# Patient Record
Sex: MALE | URBAN - METROPOLITAN AREA
[De-identification: names, ages, dates, MRNs, and addresses within clinical notes are randomized per-mention and may not be internally consistent; named-entity substitution may affect disease eponyms.]

---
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
## 2023-11-16 ENCOUNTER — OFFICE VISIT (OUTPATIENT)
Dept: FAMILY MEDICINE | Facility: CLINIC | Age: 28
End: 2023-11-16
Payer: COMMERCIAL

## 2023-11-16 VITALS
HEART RATE: 102 BPM | DIASTOLIC BLOOD PRESSURE: 87 MMHG | BODY MASS INDEX: 31.66 KG/M2 | HEIGHT: 69 IN | SYSTOLIC BLOOD PRESSURE: 127 MMHG | WEIGHT: 213.75 LBS | OXYGEN SATURATION: 98 % | TEMPERATURE: 98.3 F

## 2023-11-16 DIAGNOSIS — Z13.228 SCREENING FOR METABOLIC DISORDER: Primary | ICD-10-CM

## 2023-11-16 DIAGNOSIS — Z13.220 LIPID SCREENING: ICD-10-CM

## 2023-11-16 NOTE — NURSING NOTE
"Montrell  28 year old    Chief Complaint   Patient presents with    \[facility]\"" Care     First time getting health care in the [place], moved in Sept 2022    Gastrointestinal Problem     Blood in stool a week ago but went away after 4-5 days, has had this occur 6 months ago as well    Physical            Blood pressure 127/87, pulse 102, temperature 98.3  F (36.8  C), temperature source Skin, height 1.761 m (5' 9.33\"), weight 97 kg (213 lb 12 oz), SpO2 98%. Body mass index is 31.26 kg/m .  There is no problem list on file for this patient.             Wt Readings from Last 2 Encounters:   11/16/23 97 kg (213 lb 12 oz)       BP Readings from Last 3 Encounters:   11/16/23 127/87                No current outpatient medications on file.     No current facility-administered medications for this visit.              Social History     Tobacco Use    Smoking status: Never    Smokeless tobacco: Never   Substance Use Topics    Alcohol use: Never    Drug use: Never            There are no preventive care reminders to display for this patient.         No results found for: \"PAP\"           November 16, 2023 2:39 PM    "

## 2023-11-17 ENCOUNTER — TELEPHONE (OUTPATIENT)
Dept: FAMILY MEDICINE | Facility: CLINIC | Age: 28
End: 2023-11-17

## 2023-11-17 NOTE — TELEPHONE ENCOUNTER
Who is calling? Patient  Reason for Call: Requesting referral for the weight management program.

## 2023-11-22 ENCOUNTER — LAB (OUTPATIENT)
Dept: LAB | Facility: CLINIC | Age: 28
End: 2023-11-22
Payer: COMMERCIAL

## 2023-11-22 ENCOUNTER — MYC MEDICAL ADVICE (OUTPATIENT)
Dept: FAMILY MEDICINE | Facility: CLINIC | Age: 28
End: 2023-11-22

## 2023-11-22 DIAGNOSIS — R73.9 BLOOD GLUCOSE ELEVATED: Primary | ICD-10-CM

## 2023-11-22 DIAGNOSIS — Z13.228 SCREENING FOR METABOLIC DISORDER: ICD-10-CM

## 2023-11-22 DIAGNOSIS — Z13.220 LIPID SCREENING: ICD-10-CM

## 2023-11-22 LAB
ANION GAP SERPL CALCULATED.3IONS-SCNC: 11 MMOL/L (ref 7–15)
BUN SERPL-MCNC: 13 MG/DL (ref 6–20)
CALCIUM SERPL-MCNC: 9.5 MG/DL (ref 8.6–10)
CHLORIDE SERPL-SCNC: 100 MMOL/L (ref 98–107)
CHOLEST SERPL-MCNC: 205 MG/DL
CREAT SERPL-MCNC: 0.76 MG/DL (ref 0.67–1.17)
DEPRECATED HCO3 PLAS-SCNC: 23 MMOL/L (ref 22–29)
EGFRCR SERPLBLD CKD-EPI 2021: >90 ML/MIN/1.73M2
GLUCOSE SERPL-MCNC: 125 MG/DL (ref 70–99)
HDLC SERPL-MCNC: 35 MG/DL
LDLC SERPL CALC-MCNC: 106 MG/DL
NONHDLC SERPL-MCNC: 170 MG/DL
POTASSIUM SERPL-SCNC: 4.1 MMOL/L (ref 3.4–5.3)
SODIUM SERPL-SCNC: 134 MMOL/L (ref 135–145)
TRIGL SERPL-MCNC: 319 MG/DL

## 2023-11-22 PROCEDURE — 80061 LIPID PANEL: CPT

## 2023-11-22 PROCEDURE — 80048 BASIC METABOLIC PNL TOTAL CA: CPT

## 2023-11-24 ENCOUNTER — LAB (OUTPATIENT)
Dept: LAB | Facility: CLINIC | Age: 28
End: 2023-11-24
Payer: COMMERCIAL

## 2023-11-24 DIAGNOSIS — R73.9 BLOOD GLUCOSE ELEVATED: ICD-10-CM

## 2023-11-24 LAB — HBA1C MFR BLD: 6.9 %

## 2023-11-24 PROCEDURE — 83036 HEMOGLOBIN GLYCOSYLATED A1C: CPT

## 2023-11-24 NOTE — TELEPHONE ENCOUNTER
Placing order for HgbA1C lab test based on fasting glucose level 125 mg/dL on 11/22/2023.  Notified pt to schedule lab-only visit, and based on those results, Dr. Varela will determine next steps.    ARIANE QuintanillaN, RN  11/24/23, 9:17 AM

## 2023-11-27 ENCOUNTER — OFFICE VISIT (OUTPATIENT)
Dept: FAMILY MEDICINE | Facility: CLINIC | Age: 28
End: 2023-11-27
Payer: COMMERCIAL

## 2023-11-27 VITALS
HEART RATE: 96 BPM | TEMPERATURE: 97.6 F | SYSTOLIC BLOOD PRESSURE: 125 MMHG | OXYGEN SATURATION: 97 % | DIASTOLIC BLOOD PRESSURE: 79 MMHG

## 2023-11-27 DIAGNOSIS — E11.9 TYPE 2 DIABETES MELLITUS WITHOUT COMPLICATION, WITHOUT LONG-TERM CURRENT USE OF INSULIN (H): Primary | ICD-10-CM

## 2023-11-27 RX ORDER — METFORMIN HCL 500 MG
500 TABLET, EXTENDED RELEASE 24 HR ORAL
Qty: 90 TABLET | Refills: 1 | Status: SHIPPED | OUTPATIENT
Start: 2023-11-27 | End: 2024-05-20

## 2023-11-27 NOTE — NURSING NOTE
"Montrell  28 year old    Chief Complaint   Patient presents with    Results     Discuss lab results and treatments - A1C and cholesterol            Blood pressure 125/79, pulse 96, temperature 97.6  F (36.4  C), temperature source Skin, SpO2 97%. There is no height or weight on file to calculate BMI.  There is no problem list on file for this patient.             Wt Readings from Last 2 Encounters:   11/16/23 97 kg (213 lb 12 oz)       BP Readings from Last 3 Encounters:   11/27/23 125/79   11/16/23 127/87                No current outpatient medications on file.     No current facility-administered medications for this visit.              Social History     Tobacco Use    Smoking status: Never    Smokeless tobacco: Never   Substance Use Topics    Alcohol use: Never    Drug use: Never              Health Maintenance Due   Topic Date Due    ADVANCE CARE PLANNING  Never done    HEPATITIS B IMMUNIZATION (1 of 3 - 3-dose series) Never done    COVID-19 Vaccine (1) Never done    HIV SCREENING  Never done    HEPATITIS C SCREENING  Never done    DTAP/TDAP/TD IMMUNIZATION (1 - Tdap) Never done    INFLUENZA VACCINE (1) Never done            No results found for: \"PAP\"           November 27, 2023 2:08 PM    "

## 2023-11-27 NOTE — PROGRESS NOTES
Assessment & Plan   Problem List Items Addressed This Visit    None  Visit Diagnoses       Type 2 diabetes mellitus without complication, without long-term current use of insulin (H)    -  Primary    Relevant Medications    metFORMIN (GLUCOPHAGE XR) 500 MG 24 hr tablet           Starting metformin today, 500mg nightly. Reviewed possible books to help Montrell navigate diabetes. 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 Ideally, Montrell would like to try and manage this without medication if possible but he understands medication may be necessary. We will continue to monitor going forward.     26 minutes spent on the date of the encounter doing chart review, history and exam, documentation and further activities as noted.      MD DIANE Paredes [facility]    Subjective   Montrell is a 28 year old, presenting for the following health issues:  Results (Discuss lab results and treatments - A1C and cholesterol)      HPI   Lab Results  - A1c from 11/24 was 6.9  - Montrell also concerned about his lipid panel: mostly elevated triglycerides  - denies increased urinary frequency  - contacted his insurance provider about possibly meeting with Felecia but it doesn't appear she is in network      Review of Systems   Constitutional, HEENT, cardiovascular, pulmonary, gi and gu systems are negative, except as otherwise noted.      Objective    /79 (BP Location: Left arm, Patient Position: Sitting, Cuff Size: Adult Large)   Pulse 96   Temp 97.6  F (36.4  C) (Skin)   SpO2 97%   There is no height or weight on file to calculate BMI.  Physical Exam   GENERAL: healthy, alert and no distress  NECK: no adenopathy, no asymmetry, masses, or scars and thyroid normal to palpation  RESP: lungs clear to auscultation - no rales, rhonchi or wheezes  CV: regular rate and rhythm, normal  S1 S2, no S3 or S4, no murmur, click or rub, no peripheral edema and peripheral pulses strong  ABDOMEN: soft, nontender, no hepatosplenomegaly, no masses and bowel sounds normal  MS: no gross musculoskeletal defects noted, no edema

## 2024-02-20 ENCOUNTER — MYC REFILL (OUTPATIENT)
Dept: FAMILY MEDICINE | Facility: CLINIC | Age: 29
End: 2024-02-20

## 2024-02-20 DIAGNOSIS — E11.9 TYPE 2 DIABETES MELLITUS WITHOUT COMPLICATION, WITHOUT LONG-TERM CURRENT USE OF INSULIN (H): ICD-10-CM

## 2024-02-20 RX ORDER — METFORMIN HCL 500 MG
500 TABLET, EXTENDED RELEASE 24 HR ORAL
Qty: 90 TABLET | Refills: 1 | Status: CANCELLED | OUTPATIENT
Start: 2024-02-20

## 2024-02-20 NOTE — TELEPHONE ENCOUNTER
Pt should have refill of metformin remaining at pharmacy. Advised to call pharmacy to request they fill it. Placed order for 3 month A1c recheck after starting metformin in November. Pt instructed to schedule lab-only appointment.    German MILLAN, RN  02/20/24 11:45 AM

## 2024-02-26 ENCOUNTER — OFFICE VISIT (OUTPATIENT)
Dept: FAMILY MEDICINE | Facility: CLINIC | Age: 29
End: 2024-02-26
Payer: COMMERCIAL

## 2024-02-26 VITALS
WEIGHT: 200 LBS | SYSTOLIC BLOOD PRESSURE: 113 MMHG | TEMPERATURE: 97.6 F | OXYGEN SATURATION: 97 % | RESPIRATION RATE: 15 BRPM | DIASTOLIC BLOOD PRESSURE: 79 MMHG | HEIGHT: 69 IN | HEART RATE: 85 BPM | BODY MASS INDEX: 29.62 KG/M2

## 2024-02-26 DIAGNOSIS — E11.9 TYPE 2 DIABETES MELLITUS WITHOUT COMPLICATION, WITHOUT LONG-TERM CURRENT USE OF INSULIN (H): Primary | ICD-10-CM

## 2024-02-26 LAB — HBA1C MFR BLD: 5.9 % (ref 0–5.6)

## 2024-02-26 NOTE — PROGRESS NOTES
"  Assessment & Plan   Problem List Items Addressed This Visit    None  Visit Diagnoses       Type 2 diabetes mellitus without complication, without long-term current use of insulin (H)    -  Primary    Relevant Orders    Hemoglobin A1c (Completed)    Lipid Profile    Hemoglobin A1c           Great improvement with lifestyle changes and minimal medication. No change in treatment strategy for now. Will repeat labs including lipids in 3 months.     Carloz Varela MD  12:52 PM, February 26, 2024        Dee Stock is a 28 year old, presenting for the following health issues:  Recheck Medication (Med check for metformin and needs a hemoglobin A1c check. )    HPI   # Type 2 Diabetes Mellitus  Lab Results   Component Value Date    A1C 5.9 02/26/2024    A1C 6.9 11/24/2023     Lab Results   Component Value Date    CR 0.76 11/22/2023   No results found for: \"UMALSP\", \"UMALCR\", \"UCRR\"  - Current Regimen: metformin XR, 500mg nightly  - Missed doses: no  - Self monitoring blood gluose?: no  - Hypoglycemic episodes?: no    Wt Readings from Last 5 Encounters:   02/26/24 90.7 kg (200 lb)   11/16/23 97 kg (213 lb 12 oz)   - Exercise: consistent  - Diet: improved  - Last eye exam: annual  - Last dental exam: semi-annual    Recent Labs   Lab Test 11/22/23  0911   CHOL 205*   HDL 35*   *   TRIG 319*   - History of ASCVD?: no  - On ASA and statin?: no    Typical Goal: A1c <7.0%, fasting glucose , postprandial glucose <180  Consider checking CBC or B12 level  If h/o ASCVD, rx SGLT2i (empagliflozin, canagliflozin) or GLP1 RA (liraglutide, semaglutide, exenatide ER)  If h/o CKD, rx SGLT2i (empagliflozin, canagliflozin).         Review of Systems  Constitutional, HEENT, cardiovascular, pulmonary, gi and gu systems are negative, except as otherwise noted.      Objective    /79 (BP Location: Left arm, Patient Position: Sitting, Cuff Size: Adult Large)   Pulse 85   Temp 97.6  F (36.4  C) (Skin)   Resp 15   Ht 1.761 " "m (5' 9.33\")   Wt 90.7 kg (200 lb)   SpO2 97%   BMI 29.25 kg/m    Body mass index is 29.25 kg/m .  Physical Exam   GENERAL: alert and no distress  NECK: no adenopathy, no asymmetry, masses, or scars  RESP: lungs clear to auscultation - no rales, rhonchi or wheezes  CV: regular rate and rhythm, normal S1 S2, no S3 or S4, no murmur, click or rub, no peripheral edema  ABDOMEN: soft, nontender, no hepatosplenomegaly, no masses and bowel sounds normal  MS: no gross musculoskeletal defects noted, no edema            Signed Electronically by: Carloz Varela MD    "

## 2024-02-26 NOTE — NURSING NOTE
"28 year old  Chief Complaint   Patient presents with    Recheck Medication     Med check for metformin and needs a hemoglobin A1c check.        Blood pressure 113/79, pulse 85, temperature 97.6  F (36.4  C), temperature source Skin, resp. rate 15, height 1.761 m (5' 9.33\"), weight 90.7 kg (200 lb), SpO2 97%. Body mass index is 29.25 kg/m .  There is no problem list on file for this patient.      Wt Readings from Last 2 Encounters:   02/26/24 90.7 kg (200 lb)   11/16/23 97 kg (213 lb 12 oz)     BP Readings from Last 3 Encounters:   02/26/24 113/79   11/27/23 125/79   11/16/23 127/87         Current Outpatient Medications   Medication    metFORMIN (GLUCOPHAGE XR) 500 MG 24 hr tablet     No current facility-administered medications for this visit.       Social History     Tobacco Use    Smoking status: Never    Smokeless tobacco: Never   Vaping Use    Vaping Use: Never used   Substance Use Topics    Alcohol use: Never    Drug use: Never       Health Maintenance Due   Topic Date Due    MICROALBUMIN  Never done    DIABETIC FOOT EXAM  Never done    ADVANCE CARE PLANNING  Never done    EYE EXAM  Never done    Pneumococcal Vaccine: Pediatrics (0 to 5 Years) and At-Risk Patients (6 to 64 Years) (1 of 2 - PCV) Never done    HIV SCREENING  Never done    HEPATITIS C SCREENING  Never done    HEPATITIS B IMMUNIZATION (1 of 3 - 19+ 3-dose series) Never done    DTAP/TDAP/TD IMMUNIZATION (1 - Tdap) Never done    INFLUENZA VACCINE (1) Never done    COVID-19 Vaccine (1 - 2023-24 season) Never done    A1C  02/24/2024       No results found for: \"PAP\"      February 26, 2024 11:13 AM    "

## 2024-05-20 DIAGNOSIS — E11.9 TYPE 2 DIABETES MELLITUS WITHOUT COMPLICATION, WITHOUT LONG-TERM CURRENT USE OF INSULIN (H): ICD-10-CM

## 2024-05-20 RX ORDER — METFORMIN HCL 500 MG
500 TABLET, EXTENDED RELEASE 24 HR ORAL
Qty: 90 TABLET | Refills: 1 | Status: SHIPPED | OUTPATIENT
Start: 2024-05-20

## 2024-05-20 NOTE — TELEPHONE ENCOUNTER
Medication requested: metFORMIN (GLUCOPHAGE XR) 500 MG 24 hr tablet   Last office visit: 2/26/24  [facility] appointments: 5/30/24  Medication last refilled: 11/27/23; 90 + 1 refill  Last qualifying labs:   Component      Latest Ref Rng 2/26/2024  11:20 AM   Hemoglobin A1C      0.0 - 5.6 % 5.9 (H)      Component      Latest Ref Rng 11/22/2023  9:11 AM   GFR Estimate      >60 mL/min/1.73m2 >90      Prescription approved per [facility] Refill Protocol.    German MILLAN, RN  05/20/24 1:14 PM

## 2024-05-28 ENCOUNTER — LAB (OUTPATIENT)
Dept: LAB | Facility: CLINIC | Age: 29
End: 2024-05-28
Payer: COMMERCIAL

## 2024-05-28 DIAGNOSIS — E11.9 TYPE 2 DIABETES MELLITUS WITHOUT COMPLICATION, WITHOUT LONG-TERM CURRENT USE OF INSULIN (H): ICD-10-CM

## 2024-05-28 LAB
CHOLEST SERPL-MCNC: 198 MG/DL
FASTING STATUS PATIENT QL REPORTED: YES
HBA1C MFR BLD: 5.8 % (ref 0–5.6)
HDLC SERPL-MCNC: 34 MG/DL
LDLC SERPL CALC-MCNC: 127 MG/DL
NONHDLC SERPL-MCNC: 164 MG/DL
TRIGL SERPL-MCNC: 187 MG/DL

## 2024-05-28 PROCEDURE — 82465 ASSAY BLD/SERUM CHOLESTEROL: CPT

## 2024-05-29 NOTE — PROGRESS NOTES
"  Assessment & Plan   Problem List Items Addressed This Visit    None  Visit Diagnoses       Type 2 diabetes mellitus without complication, without long-term current use of insulin (H)    -  Primary    Lipid screening               Labs reviewed. No need for change in med therapy at this time. 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 Encouraged him to establish with local provider there at his earliest convenience for ongoing care.    The longitudinal plan of care for the diagnosis(es)/condition(s) as documented were addressed during this visit. Due to the added complexity in care, I will continue to support Montrell in the subsequent management and with ongoing continuity of care.     25 minutes spent on the date of the encounter doing chart review, history and exam, documentation and further activities as noted.    Carloz Varela MD  9:33 AM, May 30, 2024        Subjective   Montrell is a 28 year old, presenting for the following health issues:  No chief complaint on file.    HPI   \"Go over lab results\"  Recent Results (from the past 240 hour(s))   Lipid Profile    Collection Time: 05/28/24  8:13 AM   Result Value Ref Range    Cholesterol 198 <200 mg/dL    Triglycerides 187 (H) <150 mg/dL    Direct Measure HDL 34 (L) >=40 mg/dL    LDL Cholesterol Calculated 127 (H) <=100 mg/dL    Non HDL Cholesterol 164 (H) <130 mg/dL    Patient Fasting > 8hrs? Yes    Hemoglobin A1c    Collection Time: 05/28/24  8:13 AM   Result Value Ref Range    Hemoglobin A1C 5.8 (H) 0.0 - 5.6 %      Today  - reviewed labs  - Montrell wants to know if he can stay at 500mg metformin XR or if he needs to go up to 1,000mg  - denies any side effects with metformin        Review of Systems  Constitutional, HEENT, cardiovascular, pulmonary, gi and gu systems are negative, except as otherwise noted.      Objective    /75 (BP Location: Left arm, Patient Position: Sitting, Cuff Size: Adult Large)   Pulse 83   Temp 98.5  F " (36.9  C) (Temporal)   Resp 17   Wt 96.2 kg (212 lb)   SpO2 97%   BMI 31.01 kg/m    Body mass index is 31.01 kg/m .  Physical Exam   GENERAL: alert and no distress  NECK: no adenopathy, no asymmetry, masses, or scars  RESP: lungs clear to auscultation - no rales, rhonchi or wheezes  CV: regular rate and rhythm, normal S1 S2, no S3 or S4, no murmur, click or rub, no peripheral edema  ABDOMEN: soft, nontender, no hepatosplenomegaly, no masses and bowel sounds normal  MS: no gross musculoskeletal defects noted, no edema          Signed Electronically by: Carloz Varela MD

## 2024-05-30 ENCOUNTER — OFFICE VISIT (OUTPATIENT)
Dept: FAMILY MEDICINE | Facility: CLINIC | Age: 29
End: 2024-05-30
Payer: COMMERCIAL

## 2024-05-30 VITALS
SYSTOLIC BLOOD PRESSURE: 118 MMHG | OXYGEN SATURATION: 97 % | RESPIRATION RATE: 17 BRPM | DIASTOLIC BLOOD PRESSURE: 75 MMHG | WEIGHT: 212 LBS | TEMPERATURE: 98.5 F | HEART RATE: 83 BPM | BODY MASS INDEX: 31.01 KG/M2

## 2024-05-30 DIAGNOSIS — Z13.220 LIPID SCREENING: ICD-10-CM

## 2024-05-30 DIAGNOSIS — E11.9 TYPE 2 DIABETES MELLITUS WITHOUT COMPLICATION, WITHOUT LONG-TERM CURRENT USE OF INSULIN (H): Primary | ICD-10-CM

## 2024-05-30 NOTE — NURSING NOTE
"28 year old  Chief Complaint   Patient presents with    Follow Up     Lab -- recently dx with DM 2, wanting to talk about next steps after A1C.       Blood pressure 118/75, pulse 83, temperature 98.5  F (36.9  C), temperature source Temporal, resp. rate 17, weight 96.2 kg (212 lb), SpO2 97%. Body mass index is 31.01 kg/m .  There is no problem list on file for this patient.      Wt Readings from Last 2 Encounters:   05/30/24 96.2 kg (212 lb)   02/26/24 90.7 kg (200 lb)     BP Readings from Last 3 Encounters:   05/30/24 118/75   02/26/24 113/79   11/27/23 125/79         Current Outpatient Medications   Medication Sig Dispense Refill    metFORMIN (GLUCOPHAGE XR) 500 MG 24 hr tablet Take 1 tablet (500 mg) by mouth daily (with dinner) 90 tablet 1     No current facility-administered medications for this visit.       Social History     Tobacco Use    Smoking status: Never    Smokeless tobacco: Never   Vaping Use    Vaping status: Never Used   Substance Use Topics    Alcohol use: Never    Drug use: Never       Health Maintenance Due   Topic Date Due    MICROALBUMIN  Never done    DIABETIC FOOT EXAM  Never done    ADVANCE CARE PLANNING  Never done    EYE EXAM  Never done    Pneumococcal Vaccine: Pediatrics (0 to 5 Years) and At-Risk Patients (6 to 64 Years) (1 of 2 - PCV) Never done    HIV SCREENING  Never done    HEPATITIS C SCREENING  Never done    HEPATITIS B IMMUNIZATION (1 of 3 - 19+ 3-dose series) Never done    DTAP/TDAP/TD IMMUNIZATION (1 - Tdap) Never done    COVID-19 Vaccine (1 - 2023-24 season) Never done       No results found for: \"PAP\"      May 30, 2024 8:58 AM    "

## 2024-10-06 ENCOUNTER — HEALTH MAINTENANCE LETTER (OUTPATIENT)
Age: 29
End: 2024-10-06

## 2025-01-19 ENCOUNTER — HEALTH MAINTENANCE LETTER (OUTPATIENT)
Age: 30
End: 2025-01-19

## 2025-04-27 ENCOUNTER — HEALTH MAINTENANCE LETTER (OUTPATIENT)
Age: 30
End: 2025-04-27

## 2025-08-10 ENCOUNTER — HEALTH MAINTENANCE LETTER (OUTPATIENT)
Age: 30
End: 2025-08-10